# Patient Record
Sex: MALE | URBAN - METROPOLITAN AREA
[De-identification: names, ages, dates, MRNs, and addresses within clinical notes are randomized per-mention and may not be internally consistent; named-entity substitution may affect disease eponyms.]

---

## 2017-04-11 ENCOUNTER — IMPORTED ENCOUNTER (OUTPATIENT)
Dept: URBAN - METROPOLITAN AREA CLINIC 59 | Facility: CLINIC | Age: 72
End: 2017-04-11

## 2017-04-11 PROBLEM — H04.123 TEAR FILM INSUFFICIENCY OF BILATERAL LACRIMAL GLANDS: Noted: 2017-04-11

## 2017-04-11 PROBLEM — H40.013 OPEN ANGLE WITH BORDERLINE FINDINGS, LOW RISK, BILATERAL: Noted: 2017-04-11

## 2017-04-11 PROBLEM — H25.13 BILATERAL NUCLEAR SCLEROSIS CATARACTS: Noted: 2017-04-11

## 2017-04-11 PROCEDURE — 92083 EXTENDED VISUAL FIELD XM: CPT

## 2017-04-11 PROCEDURE — 92014 COMPRE OPH EXAM EST PT 1/>: CPT

## 2017-04-11 PROCEDURE — 92133 CPTRZD OPH DX IMG PST SGM ON: CPT

## 2017-05-25 ENCOUNTER — IMPORTED ENCOUNTER (OUTPATIENT)
Dept: URBAN - METROPOLITAN AREA CLINIC 59 | Facility: CLINIC | Age: 72
End: 2017-05-25

## 2018-07-30 ENCOUNTER — OFFICE VISIT (OUTPATIENT)
Dept: URBAN - METROPOLITAN AREA CLINIC 45 | Facility: CLINIC | Age: 73
End: 2018-07-30
Payer: MEDICARE

## 2018-07-30 PROCEDURE — 92004 COMPRE OPH EXAM NEW PT 1/>: CPT | Performed by: OPTOMETRIST

## 2018-07-30 PROCEDURE — 92250 FUNDUS PHOTOGRAPHY W/I&R: CPT | Performed by: OPTOMETRIST

## 2018-07-30 ASSESSMENT — INTRAOCULAR PRESSURE
OS: 12
OD: 13

## 2018-07-30 ASSESSMENT — KERATOMETRY
OD: 43.88
OS: 44.00

## 2018-07-30 NOTE — IMPRESSION/PLAN
Impression: Dry eye syndrome of bilateral lacrimal glands: H04.123. Plan: preservative free artificial tears every 2-3 hours, plenty of fluids (avoid alcohol), warm compresses 2 x day, humidifier, blink frequently particularly when on the computer/tv/reading, ointment before bed, moisture goggles before bed.

## 2018-07-30 NOTE — IMPRESSION/PLAN
Impression: Open angle with borderline findings, high risk, bilateral: H40.023. Plan: photos taken today.

## 2018-08-24 ENCOUNTER — OFFICE VISIT (OUTPATIENT)
Dept: URBAN - METROPOLITAN AREA CLINIC 45 | Facility: CLINIC | Age: 73
End: 2018-08-24
Payer: MEDICARE

## 2018-08-24 DIAGNOSIS — H52.4 PRESBYOPIA: ICD-10-CM

## 2018-08-24 DIAGNOSIS — H40.023 OPEN ANGLE WITH BORDERLINE FINDINGS, HIGH RISK, BILATERAL: Primary | ICD-10-CM

## 2018-08-24 PROCEDURE — 92083 EXTENDED VISUAL FIELD XM: CPT | Performed by: OPTOMETRIST

## 2018-08-24 PROCEDURE — 99213 OFFICE O/P EST LOW 20 MIN: CPT | Performed by: OPTOMETRIST

## 2018-08-24 PROCEDURE — 92133 CPTRZD OPH DX IMG PST SGM ON: CPT | Performed by: OPTOMETRIST

## 2018-08-24 ASSESSMENT — VISUAL ACUITY
OS: 20/20
OD: 20/20

## 2018-08-24 ASSESSMENT — INTRAOCULAR PRESSURE
OD: 15
OS: 15

## 2018-08-24 NOTE — IMPRESSION/PLAN
Impression: Open angle with borderline findings, high risk, bilateral: H40.023.  Plan: normal oct, normal vf, normal iops

## 2018-09-12 ENCOUNTER — OFFICE VISIT (OUTPATIENT)
Dept: URBAN - METROPOLITAN AREA CLINIC 45 | Facility: CLINIC | Age: 73
End: 2018-09-12
Payer: MEDICARE

## 2018-09-12 PROCEDURE — V2799 MISC VISION ITEM OR SERVICE: HCPCS | Performed by: OPTOMETRIST

## 2018-09-12 ASSESSMENT — VISUAL ACUITY
OS: 20/30
OD: 20/30

## 2019-09-30 ENCOUNTER — OFFICE VISIT (OUTPATIENT)
Dept: URBAN - METROPOLITAN AREA CLINIC 45 | Facility: CLINIC | Age: 74
End: 2019-09-30
Payer: MEDICARE

## 2019-09-30 DIAGNOSIS — H25.13 AGE-RELATED NUCLEAR CATARACT, BILATERAL: ICD-10-CM

## 2019-09-30 DIAGNOSIS — H04.123 DRY EYE SYNDROME OF BILATERAL LACRIMAL GLANDS: ICD-10-CM

## 2019-09-30 PROCEDURE — 92014 COMPRE OPH EXAM EST PT 1/>: CPT | Performed by: OPTOMETRIST

## 2019-09-30 PROCEDURE — 92133 CPTRZD OPH DX IMG PST SGM ON: CPT | Performed by: OPTOMETRIST

## 2019-09-30 ASSESSMENT — INTRAOCULAR PRESSURE
OS: 17
OD: 15

## 2019-09-30 ASSESSMENT — KERATOMETRY
OS: 43.88
OD: 44.00

## 2019-09-30 ASSESSMENT — VISUAL ACUITY
OD: 20/20
OS: 20/20

## 2019-09-30 NOTE — IMPRESSION/PLAN
Impression: Open angle with borderline findings, high risk, bilateral: H40.023.  Plan: oct ordered and done today ou- NORMAL

## 2019-10-10 ENCOUNTER — OFFICE VISIT (OUTPATIENT)
Dept: URBAN - METROPOLITAN AREA CLINIC 45 | Facility: CLINIC | Age: 74
End: 2019-10-10
Payer: MEDICARE

## 2019-10-10 DIAGNOSIS — H16.142 PUNCTATE KERATITIS, LEFT EYE: Primary | ICD-10-CM

## 2019-10-10 PROCEDURE — 92012 INTRM OPH EXAM EST PATIENT: CPT | Performed by: OPTOMETRIST

## 2019-10-10 ASSESSMENT — INTRAOCULAR PRESSURE
OD: 13
OS: 17

## 2020-09-04 ENCOUNTER — OFFICE VISIT (OUTPATIENT)
Dept: URBAN - METROPOLITAN AREA CLINIC 45 | Facility: CLINIC | Age: 75
End: 2020-09-04
Payer: MEDICARE

## 2020-09-04 DIAGNOSIS — H43.811 VITREOUS DEGENERATION, RIGHT EYE: Primary | ICD-10-CM

## 2020-09-04 PROCEDURE — 99214 OFFICE O/P EST MOD 30 MIN: CPT | Performed by: OPTOMETRIST

## 2020-09-04 ASSESSMENT — INTRAOCULAR PRESSURE
OD: 11
OS: 15

## 2020-09-30 ENCOUNTER — OFFICE VISIT (OUTPATIENT)
Dept: URBAN - METROPOLITAN AREA CLINIC 45 | Facility: CLINIC | Age: 75
End: 2020-09-30
Payer: MEDICARE

## 2020-09-30 PROCEDURE — 92133 CPTRZD OPH DX IMG PST SGM ON: CPT | Performed by: OPTOMETRIST

## 2020-09-30 PROCEDURE — 92014 COMPRE OPH EXAM EST PT 1/>: CPT | Performed by: OPTOMETRIST

## 2020-09-30 ASSESSMENT — INTRAOCULAR PRESSURE
OD: 14
OS: 14

## 2020-09-30 NOTE — IMPRESSION/PLAN
Impression: Open angle with borderline findings, high risk, bilateral: H40.023. Plan: OCT ordered and done today, stable. 85 83. Will continue to monitor for changes.

## 2020-09-30 NOTE — IMPRESSION/PLAN
Impression: Vitreous degeneration, right eye: H43.811 Right. Plan: Discussed diagnosis in detail with patient. Discussed treatment options with patient, though no treatment is required/recommended at this time. Discussed signs and symptoms of PVD/floaters. Discussed signs and symptoms of retinal detachment. Reassured patient of current condition and treatment. Will continue to observe condition and or symptoms.

## 2021-10-01 ENCOUNTER — OFFICE VISIT (OUTPATIENT)
Dept: URBAN - METROPOLITAN AREA CLINIC 45 | Facility: CLINIC | Age: 76
End: 2021-10-01
Payer: MEDICARE

## 2021-10-01 DIAGNOSIS — H11.153 PINGUECULA, BILATERAL: ICD-10-CM

## 2021-10-01 PROCEDURE — 92133 CPTRZD OPH DX IMG PST SGM ON: CPT | Performed by: OPTOMETRIST

## 2021-10-01 PROCEDURE — 92014 COMPRE OPH EXAM EST PT 1/>: CPT | Performed by: OPTOMETRIST

## 2021-10-01 ASSESSMENT — INTRAOCULAR PRESSURE
OS: 14
OD: 15

## 2021-10-01 ASSESSMENT — VISUAL ACUITY
OS: 20/20
OD: 20/25

## 2021-10-01 NOTE — IMPRESSION/PLAN
Impression: Age-related nuclear cataract, bilateral: H25.13. Plan: Cataracts account for pt complaints. Do not recommend SX at this time.  Will monitor for changes

## 2021-10-01 NOTE — IMPRESSION/PLAN
Impression: Open angle with borderline findings, high risk, bilateral: H40.023. Plan: oct ordered and done today ou, normal, stable. IOP is good. No gtts. Will continue to monitor.

## 2021-10-01 NOTE — IMPRESSION/PLAN
Impression: Pinguecula, bilateral: H11.153. Plan: Patient instructed to use artificial tears as needed. Wear dark shades when outdoors.

## 2022-01-01 NOTE — IMPRESSION/PLAN
Impression: Age-related nuclear cataract, bilateral: H25.13. Plan: Cataracts account for pt complaints. Do not recommend SX at this time.  Will monitor for changes Unvaccinated pt with temps of 100.3 at home- uto bp, bcr, lungs clear + nasal congestion, easy wob

## 2022-08-05 ENCOUNTER — OFFICE VISIT (OUTPATIENT)
Dept: URBAN - METROPOLITAN AREA CLINIC 45 | Facility: CLINIC | Age: 77
End: 2022-08-05
Payer: MEDICARE

## 2022-08-05 DIAGNOSIS — H25.13 AGE-RELATED NUCLEAR CATARACT, BILATERAL: ICD-10-CM

## 2022-08-05 DIAGNOSIS — H40.023 OPEN ANGLE WITH BORDERLINE FINDINGS, HIGH RISK, BILATERAL: Primary | ICD-10-CM

## 2022-08-05 DIAGNOSIS — H11.153 PINGUECULA, BILATERAL: ICD-10-CM

## 2022-08-05 PROCEDURE — 76514 ECHO EXAM OF EYE THICKNESS: CPT | Performed by: OPTOMETRIST

## 2022-08-05 PROCEDURE — 92133 CPTRZD OPH DX IMG PST SGM ON: CPT | Performed by: OPTOMETRIST

## 2022-08-05 PROCEDURE — 92014 COMPRE OPH EXAM EST PT 1/>: CPT | Performed by: OPTOMETRIST

## 2022-08-05 ASSESSMENT — INTRAOCULAR PRESSURE
OD: 14
OS: 14

## 2022-08-05 NOTE — IMPRESSION/PLAN
Impression: Open angle with borderline findings, high risk, bilateral: H40.023.  Plan: pachs and oct ordered and done today ou

pachs- thick ou

oct- normal ou

iop is normal ou

no treatment, monitor

## 2023-10-11 ENCOUNTER — OFFICE VISIT (OUTPATIENT)
Dept: URBAN - METROPOLITAN AREA CLINIC 45 | Facility: CLINIC | Age: 78
End: 2023-10-11
Payer: MEDICARE

## 2023-10-11 DIAGNOSIS — H04.123 DRY EYE SYNDROME OF BILATERAL LACRIMAL GLANDS: ICD-10-CM

## 2023-10-11 DIAGNOSIS — H25.13 AGE-RELATED NUCLEAR CATARACT, BILATERAL: ICD-10-CM

## 2023-10-11 DIAGNOSIS — H40.023 OPEN ANGLE WITH BORDERLINE FINDINGS, HIGH RISK, BILATERAL: Primary | ICD-10-CM

## 2023-10-11 DIAGNOSIS — H10.45 OTHER CHRONIC ALLERGIC CONJUNCTIVITIS: ICD-10-CM

## 2023-10-11 PROCEDURE — 92014 COMPRE OPH EXAM EST PT 1/>: CPT | Performed by: OPTOMETRIST

## 2023-10-11 ASSESSMENT — INTRAOCULAR PRESSURE
OS: 15
OD: 14

## 2023-10-21 ASSESSMENT — VISUAL ACUITY
OD_CC: 20/20-2
OS_CC: 20/20

## 2023-10-21 ASSESSMENT — TONOMETRY
OS_IOP_MMHG: 13
OD_IOP_MMHG: 13

## 2023-11-09 ENCOUNTER — OFFICE VISIT (OUTPATIENT)
Dept: URBAN - METROPOLITAN AREA CLINIC 45 | Facility: CLINIC | Age: 78
End: 2023-11-09
Payer: MEDICARE

## 2023-11-09 DIAGNOSIS — H40.023 OPEN ANGLE WITH BORDERLINE FINDINGS, HIGH RISK, BILATERAL: Primary | ICD-10-CM

## 2023-11-09 PROCEDURE — 92133 CPTRZD OPH DX IMG PST SGM ON: CPT | Performed by: OPTOMETRIST

## 2023-11-09 PROCEDURE — 92083 EXTENDED VISUAL FIELD XM: CPT | Performed by: OPTOMETRIST

## 2023-11-09 PROCEDURE — 99212 OFFICE O/P EST SF 10 MIN: CPT | Performed by: OPTOMETRIST

## 2023-11-09 ASSESSMENT — INTRAOCULAR PRESSURE
OS: 14
OD: 14

## 2024-11-13 ENCOUNTER — OFFICE VISIT (OUTPATIENT)
Dept: URBAN - METROPOLITAN AREA CLINIC 45 | Facility: CLINIC | Age: 79
End: 2024-11-13
Payer: MEDICARE

## 2024-11-13 DIAGNOSIS — D23.10 OTHER BENIGN NEOPLASM OF SKIN OF UNSPECIFIED EYELID, INCLUDING CANTHUS: ICD-10-CM

## 2024-11-13 DIAGNOSIS — H52.4 PRESBYOPIA: ICD-10-CM

## 2024-11-13 DIAGNOSIS — H11.153 PINGUECULA, BILATERAL: ICD-10-CM

## 2024-11-13 DIAGNOSIS — H40.023 OPEN ANGLE WITH BORDERLINE FINDINGS, HIGH RISK, BILATERAL: Primary | ICD-10-CM

## 2024-11-13 DIAGNOSIS — H25.813 COMBINED FORMS OF AGE-RELATED CATARACT, BILATERAL: ICD-10-CM

## 2024-11-13 DIAGNOSIS — H04.123 DRY EYE SYNDROME OF BILATERAL LACRIMAL GLANDS: ICD-10-CM

## 2024-11-13 PROCEDURE — 92014 COMPRE OPH EXAM EST PT 1/>: CPT | Performed by: OPTOMETRIST

## 2024-11-13 PROCEDURE — 92133 CPTRZD OPH DX IMG PST SGM ON: CPT | Performed by: OPTOMETRIST

## 2024-11-13 PROCEDURE — 92083 EXTENDED VISUAL FIELD XM: CPT | Performed by: OPTOMETRIST

## 2024-11-13 ASSESSMENT — INTRAOCULAR PRESSURE
OS: 18
OD: 15

## 2024-11-13 ASSESSMENT — VISUAL ACUITY
OD: 20/30
OS: 20/25